# Patient Record
Sex: FEMALE | Race: WHITE | Employment: UNEMPLOYED | URBAN - METROPOLITAN AREA
[De-identification: names, ages, dates, MRNs, and addresses within clinical notes are randomized per-mention and may not be internally consistent; named-entity substitution may affect disease eponyms.]

---

## 2024-04-04 ENCOUNTER — OFFICE VISIT (OUTPATIENT)
Age: 39
End: 2024-04-04
Payer: COMMERCIAL

## 2024-04-04 VITALS
SYSTOLIC BLOOD PRESSURE: 130 MMHG | HEART RATE: 97 BPM | HEIGHT: 66 IN | BODY MASS INDEX: 26.92 KG/M2 | OXYGEN SATURATION: 99 % | DIASTOLIC BLOOD PRESSURE: 88 MMHG | TEMPERATURE: 97.8 F | WEIGHT: 167.5 LBS

## 2024-04-04 DIAGNOSIS — H93.A1 PULSATILE TINNITUS OF RIGHT EAR: Primary | ICD-10-CM

## 2024-04-04 DIAGNOSIS — H93.A1 PULSATILE TINNITUS OF RIGHT EAR: ICD-10-CM

## 2024-04-04 PROCEDURE — 99205 OFFICE O/P NEW HI 60 MIN: CPT | Performed by: RADIOLOGY

## 2024-04-04 RX ORDER — INDOMETHACIN 25 MG/1
25 CAPSULE ORAL 3 TIMES DAILY
COMMUNITY
Start: 2024-02-01

## 2024-04-04 NOTE — PROGRESS NOTES
New patient referred by Dr Castano @ [facility] presenting with Pulsatile tinnitus.  Spouse at visit.  Patient reports multiple symptoms.  Reports pulsing in right ear, headaches, ringing in ears and episodes of stabbing pain in head.

## 2024-04-04 NOTE — PATIENT INSTRUCTIONS
Diagnostic Angiogram at [facility]. Arrival time and date will be called to you.   Patient Registration. Main Hospital Entrance.  Blood work will be needed at least one week prior to procedure at a [facility].  -No eating or drinking after midnight the night prior to Angiogram.  -Take all morning medications with sips of water the morning of the angiogram.  -You must have a  to drive you home upon discharge.  -Recommend you have someone with you for at least 24-48 hours post procedure.  -No metal or glue in hair.    Referral to Neurology.

## 2024-04-05 DIAGNOSIS — H93.A1 PULSATILE TINNITUS OF RIGHT EAR: Primary | ICD-10-CM

## 2024-04-11 NOTE — PROGRESS NOTES
Clinic Note      Patient: Geri Wilcox MRN: [id number]  SSN: [id number]    YOB: 1985  Age: 38 y.o.  Sex: female      Subjective:      Geri Wilcox is a 38 y.o. female new patient with history of migraine headaches who presents on referral from Dr. Reyez for evaluation of right-sided pulsatile tinnitus.    Patient reports a whooshing sound that his pulse synchronous in her right ear.  This has been occurring for approximately 1 year.  She had a routine ENT surgery around that time and became more aware of the sound.  She does not believe that it is related to the surgery.  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  She also reports headaches, ringing in the ears, and episodes of stabbing pain in the head.  She denies any focal neurological symptoms.    Past Medical History:   Diagnosis Date    Migraines     Ringing in ears      History reviewed. No pertinent surgical history.   Family History   Problem Relation Age of Onset    Migraines Mother      Social History     Tobacco Use    Smoking status: Never    Smokeless tobacco: Never   Substance Use Topics    Alcohol use: Not Currently      Current Outpatient Medications   Medication Sig Dispense Refill    indomethacin (INDOCIN) 25 MG capsule Take 1 capsule by mouth in the morning, at noon, and at bedtime      MELATONIN PO Take 6 mg by mouth nightly       No current facility-administered medications for this visit.        No Known Allergies    Review of Systems:  Pertinent items are noted in the History of Present Illness.    Objective:     Vitals:    04/04/24 1131   BP: 130/88   Site: Left Upper Arm   Position: Sitting   Pulse: 97   Temp: 97.8 °F (36.6 °C)   TempSrc: Temporal   SpO2: 99%   Weight: 76 kg (167 lb 8 oz)   Height: 1.664 m (5' 5.5\")        Physical Exam:  GENERAL: alert, cooperative, no distress, appears stated age  EYE: negative  HEAD & NECK: no carotid bruit; bruit

## 2024-04-11 NOTE — H&P (VIEW-ONLY)
Clinic Note      Patient: Geri Wilcox MRN: [id number]  SSN: [id number]    YOB: 1985  Age: 38 y.o.  Sex: female      Subjective:      Geri Wilcox is a 38 y.o. female new patient with history of migraine headaches who presents on referral from Dr. Reyez for evaluation of right-sided pulsatile tinnitus.    Patient reports a whooshing sound that his pulse synchronous in her right ear.  This has been occurring for approximately 1 year.  She had a routine ENT surgery around that time and became more aware of the sound.  She does not believe that it is related to the surgery.  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  She also reports headaches, ringing in the ears, and episodes of stabbing pain in the head.  She denies any focal neurological symptoms.    Past Medical History:   Diagnosis Date    Migraines     Ringing in ears      History reviewed. No pertinent surgical history.   Family History   Problem Relation Age of Onset    Migraines Mother      Social History     Tobacco Use    Smoking status: Never    Smokeless tobacco: Never   Substance Use Topics    Alcohol use: Not Currently      Current Outpatient Medications   Medication Sig Dispense Refill    indomethacin (INDOCIN) 25 MG capsule Take 1 capsule by mouth in the morning, at noon, and at bedtime      MELATONIN PO Take 6 mg by mouth nightly       No current facility-administered medications for this visit.        No Known Allergies    Review of Systems:  Pertinent items are noted in the History of Present Illness.    Objective:     Vitals:    04/04/24 1131   BP: 130/88   Site: Left Upper Arm   Position: Sitting   Pulse: 97   Temp: 97.8 °F (36.6 °C)   TempSrc: Temporal   SpO2: 99%   Weight: 76 kg (167 lb 8 oz)   Height: 1.664 m (5' 5.5\")        Physical Exam:  GENERAL: alert, cooperative, no distress, appears stated age  EYE: negative  HEAD & NECK: no carotid bruit; bruit

## 2024-04-12 ENCOUNTER — CLINICAL DOCUMENTATION (OUTPATIENT)
Age: 39
End: 2024-04-12

## 2024-04-26 ENCOUNTER — TELEPHONE (OUTPATIENT)
Age: 39
End: 2024-04-26

## 2024-04-26 NOTE — TELEPHONE ENCOUNTER
Spoke to patient regarding taking Aspirin the night prior.  Informed patient to take 650 mg Aspirin the night prior to Diagnostic angiogram.  Patient has questions about the procedure. All questions were answered.  Patient stated understanding.

## 2024-04-29 ENCOUNTER — TELEPHONE (OUTPATIENT)
Age: 39
End: 2024-04-29

## 2024-04-29 NOTE — TELEPHONE ENCOUNTER
Spoke to patient to confirm procedure tomorrow.   Arrival time 9:00 am at Patient registration.    Reminder to patient:  -No eating or drinking after midnight the day prior to procedure.   -Take morning medications the morning of procedure with sips of water.   -Do not stop taking Blood Thinners if applicable unless notified by this office.  -You must have a  to drive you home upon discharge.  -Recommend you have someone with you for at least 24-48 hours post procedure.  -No metal of glue in hair.    Patient to take 650 mg Aspirin tonight.    Allergies reviewed.  Patient stated understanding.    Patient stated she hears something in her left ear now. Previously it was only her right ear.  Advised patient to mention this to provider tomorrow.

## 2024-04-30 ENCOUNTER — HOSPITAL ENCOUNTER (OUTPATIENT)
Facility: HOSPITAL | Age: 39
Discharge: HOME | End: 2024-04-30
Attending: RADIOLOGY | Admitting: RADIOLOGY
Payer: COMMERCIAL

## 2024-04-30 VITALS
HEART RATE: 73 BPM | BODY MASS INDEX: 27.82 KG/M2 | TEMPERATURE: 97.9 F | SYSTOLIC BLOOD PRESSURE: 127 MMHG | RESPIRATION RATE: 14 BRPM | DIASTOLIC BLOOD PRESSURE: 81 MMHG | OXYGEN SATURATION: 99 % | WEIGHT: 167 LBS | HEIGHT: 65 IN

## 2024-04-30 DIAGNOSIS — H93.A1 PULSATILE TINNITUS OF RIGHT EAR: ICD-10-CM

## 2024-04-30 LAB — HCG UR QL: NEGATIVE

## 2024-04-30 PROCEDURE — 81025 URINE PREGNANCY TEST: CPT

## 2024-04-30 PROCEDURE — 75710 ARTERY X-RAYS ARM/LEG: CPT

## 2024-04-30 PROCEDURE — 6370000000 HC RX 637 (ALT 250 FOR IP): Performed by: RADIOLOGY

## 2024-04-30 PROCEDURE — 2580000003 HC RX 258: Performed by: RADIOLOGY

## 2024-04-30 PROCEDURE — 6360000004 HC RX CONTRAST MEDICATION: Performed by: RADIOLOGY

## 2024-04-30 PROCEDURE — 2500000003 HC RX 250 WO HCPCS: Performed by: RADIOLOGY

## 2024-04-30 PROCEDURE — 6360000002 HC RX W HCPCS: Performed by: RADIOLOGY

## 2024-04-30 RX ORDER — VERAPAMIL HYDROCHLORIDE 2.5 MG/ML
INJECTION, SOLUTION INTRAVENOUS PRN
Status: COMPLETED | OUTPATIENT
Start: 2024-04-30 | End: 2024-04-30

## 2024-04-30 RX ORDER — 0.9 % SODIUM CHLORIDE 0.9 %
INTRAVENOUS SOLUTION INTRAVENOUS CONTINUOUS PRN
Status: COMPLETED | OUTPATIENT
Start: 2024-04-30 | End: 2024-04-30

## 2024-04-30 RX ORDER — LIDOCAINE 40 MG/G
CREAM TOPICAL PRN
Status: COMPLETED | OUTPATIENT
Start: 2024-04-30 | End: 2024-04-30

## 2024-04-30 RX ORDER — ACETAMINOPHEN AND CODEINE PHOSPHATE 300; 30 MG/1; MG/1
1 TABLET ORAL 2 TIMES DAILY PRN
COMMUNITY
Start: 2024-04-08

## 2024-04-30 RX ORDER — FENTANYL CITRATE 50 UG/ML
INJECTION, SOLUTION INTRAMUSCULAR; INTRAVENOUS PRN
Status: COMPLETED | OUTPATIENT
Start: 2024-04-30 | End: 2024-04-30

## 2024-04-30 RX ORDER — ASPIRIN 325 MG
650 TABLET ORAL ONCE
COMMUNITY
End: 2024-05-02 | Stop reason: DRUGHIGH

## 2024-04-30 RX ORDER — DIPHENHYDRAMINE HYDROCHLORIDE 50 MG/ML
INJECTION INTRAMUSCULAR; INTRAVENOUS PRN
Status: COMPLETED | OUTPATIENT
Start: 2024-04-30 | End: 2024-04-30

## 2024-04-30 RX ORDER — MIDAZOLAM HYDROCHLORIDE 2 MG/2ML
INJECTION, SOLUTION INTRAMUSCULAR; INTRAVENOUS PRN
Status: COMPLETED | OUTPATIENT
Start: 2024-04-30 | End: 2024-04-30

## 2024-04-30 RX ORDER — VALACYCLOVIR HYDROCHLORIDE 1 G/1
TABLET, FILM COATED ORAL
COMMUNITY
Start: 2024-03-27

## 2024-04-30 RX ORDER — HEPARIN SODIUM 1000 [USP'U]/ML
INJECTION, SOLUTION INTRAVENOUS; SUBCUTANEOUS PRN
Status: COMPLETED | OUTPATIENT
Start: 2024-04-30 | End: 2024-04-30

## 2024-04-30 RX ORDER — ACETAMINOPHEN 500 MG
1000 TABLET ORAL ONCE
Status: COMPLETED | OUTPATIENT
Start: 2024-04-30 | End: 2024-04-30

## 2024-04-30 RX ORDER — ONDANSETRON 2 MG/ML
INJECTION INTRAMUSCULAR; INTRAVENOUS PRN
Status: COMPLETED | OUTPATIENT
Start: 2024-04-30 | End: 2024-04-30

## 2024-04-30 RX ADMIN — FENTANYL CITRATE 25 MCG: 50 INJECTION, SOLUTION INTRAMUSCULAR; INTRAVENOUS at 12:13

## 2024-04-30 RX ADMIN — HEPARIN SODIUM 4000 ML: 1000 INJECTION INTRAVENOUS; SUBCUTANEOUS at 12:15

## 2024-04-30 RX ADMIN — IOPAMIDOL 59 ML: 612 INJECTION, SOLUTION INTRAVENOUS at 12:15

## 2024-04-30 RX ADMIN — LIDOCAINE 1 TUBE: 40 CREAM TOPICAL at 10:57

## 2024-04-30 RX ADMIN — Medication 200 MCG: at 11:22

## 2024-04-30 RX ADMIN — ONDANSETRON 4 MG: 2 INJECTION INTRAMUSCULAR; INTRAVENOUS at 10:56

## 2024-04-30 RX ADMIN — ACETAMINOPHEN 1000 MG: 500 TABLET ORAL at 12:29

## 2024-04-30 RX ADMIN — DIPHENHYDRAMINE HYDROCHLORIDE 25 MG: 50 INJECTION, SOLUTION INTRAMUSCULAR; INTRAVENOUS at 10:57

## 2024-04-30 RX ADMIN — HEPARIN SODIUM 2000 UNITS: 1000 INJECTION INTRAVENOUS; SUBCUTANEOUS at 11:21

## 2024-04-30 RX ADMIN — MIDAZOLAM HYDROCHLORIDE 0.5 MG: 1 INJECTION, SOLUTION INTRAMUSCULAR; INTRAVENOUS at 11:12

## 2024-04-30 RX ADMIN — SODIUM CHLORIDE 500 ML: 9 INJECTION, SOLUTION INTRAVENOUS at 11:02

## 2024-04-30 RX ADMIN — VERAPAMIL HYDROCHLORIDE 2.5 MG: 2.5 INJECTION, SOLUTION INTRAVENOUS at 11:21

## 2024-04-30 RX ADMIN — FENTANYL CITRATE 25 MCG: 50 INJECTION, SOLUTION INTRAMUSCULAR; INTRAVENOUS at 11:12

## 2024-04-30 RX ADMIN — NITROGLYCERIN 1 INCH: 20 OINTMENT TOPICAL at 10:57

## 2024-04-30 ASSESSMENT — PULMONARY FUNCTION TESTS
PIF_VALUE: 0

## 2024-04-30 ASSESSMENT — PAIN SCALES - GENERAL: PAINLEVEL_OUTOF10: 2

## 2024-04-30 ASSESSMENT — PAIN DESCRIPTION - FREQUENCY: FREQUENCY: INTERMITTENT

## 2024-04-30 ASSESSMENT — PAIN DESCRIPTION - ORIENTATION: ORIENTATION: RIGHT

## 2024-04-30 ASSESSMENT — PAIN DESCRIPTION - PAIN TYPE: TYPE: ACUTE PAIN

## 2024-04-30 ASSESSMENT — PAIN DESCRIPTION - LOCATION: LOCATION: HEAD

## 2024-04-30 ASSESSMENT — PAIN DESCRIPTION - DESCRIPTORS: DESCRIPTORS: SHARP;SHOOTING

## 2024-04-30 NOTE — DISCHARGE INSTRUCTIONS
Brain Angiogram: What to Expect at Home  Your Recovery  A brain angiogram (cerebral angiogram) is a test (also called a procedure) that looks for problems with blood vessels and blood flow in the brain. The doctor inserted a thin, flexible tube (catheter) into a blood vessel in your groin. Or the doctor may have put the catheter in a blood vessel in your arm. Then a dye was inserted into the catheter. The dye flowed into the blood vessel. The dye made the blood vessels show up on a video screen.  You may have had this test to see if a blood vessel in the brain is bulging, narrowed, or blocked. The test may also be used to check other symptoms, such as unusual headaches, or to check problems found during a different test.  You may have a bruise where the catheter was put in, and you may feel sore for a few days. You can do light activities around the house. But don't do anything strenuous for several days.  This care sheet gives you a general idea about how long it will take for you to recover. But each person recovers at a different pace. Follow the steps below to feel better as quickly as possible.  How can you care for yourself at home?  Activity    Do not do strenuous exercise and do not lift, pull, or push anything heavy until your doctor says it is okay. This may be for several days. You can walk around the house and do light activity, such as cooking.     If the catheter was placed in your groin, try not to walk up stairs for the first couple of days.     If the catheter was placed in your arm near your wrist, do not bend your wrist deeply for the first couple of days. Be careful using your hand to get into and out of a chair or bed.     If your doctor recommends it, get more exercise. Walking is a good choice. Bit by bit, increase the amount you walk every day. Try for at least 30 minutes on most days of the week.   Diet    Drink plenty of fluids to help your body flush out the dye. If you have kidney,

## 2024-04-30 NOTE — BRIEF OP NOTE
Brief Procedure Note      Patient: Geri Wilcox MRN: [id number]     YOB: 1985  Age: 38 y.o.  Sex: female      Service: Neurointerventional Surgery    Date of Procedure: 4/30/2024    Pre-Procedure Diagnosis: Pulsatile tinnitus    Post-Procedure Diagnosis: SAME    : Vincent Ramesh MD    Assistant(s): N/A    Procedure(s):   Diagnostic cerebral angiogram  Ultrasound guided vascular access  Placement of arteriotomy closure device    Vessels Studied:   Right CCA  Right ICA  Right ECA  Left CCA  Left subclavian artery  Right vertebral artery    Puncture Site: Right radial artery    Preliminary Findings:   No dAVF.    Plan:   RTC to review findings      Specimens: None    Implants: None    Drains: None    Anesthesia: Moderate Sedation    Estimated Blood Loss: 5 cc      Apparent Intraoperative Complications: None immediate    Patient Condition: Stable    Disposition: Same day recovery unit      Signed:   Vincent Ramesh MD    [facility] Neurointerventional Surgery  [facility]

## 2024-04-30 NOTE — PRE SEDATION
Sedation Pre-Procedure Note    Patient Name: Geri Wilcox   YOB: 1985  Room/Bed: AIR/PL  Medical Record Number: [id number]  Date: 4/30/2024   Time: 10:27 AM       Indication:  Pulsatile tinnitus    Consent: I have discussed with the patient and/or the patient representative the indication, alternatives, and the possible risks and/or complications of the planned procedure and the anesthesia methods. The patient and/or patient representative appear to understand and agree to proceed.    Vital Signs:   Vitals:    04/30/24 0930   BP: (!) 133/93   Pulse: 76   Resp: 11   Temp: 98.3 °F (36.8 °C)   SpO2: 99%       Past Medical History:   has a past medical history of Migraines and Ringing in ears.    Past Surgical History:   has a past surgical history that includes Salivary gland surgery.    Medications:   Scheduled Meds:    heparin 4000 units/1000 mL (4 units/mL) in NS   IntraVENous Once     Continuous Infusions:   PRN Meds:   Home Meds:   Prior to Admission medications    Medication Sig Start Date End Date Taking? Authorizing Provider   acetaminophen-codeine (TYLENOL #3) 300-30 MG per tablet Take 1 tablet by mouth 2 times daily as needed. Max Daily Amount: 2 tablets 4/8/24  Yes Teresa Schreiber MD   valACYclovir (VALTREX) 1 g tablet  3/27/24  Yes Teresa Schreiber MD   aspirin 325 MG tablet Take 2 tablets by mouth once   Yes Teresa Schreiber MD   indomethacin (INDOCIN) 25 MG capsule Take 1 capsule by mouth in the morning, at noon, and at bedtime 2/1/24   Teresa Schreiber MD   MELATONIN PO Take 6 mg by mouth nightly    Teresa Schreiber MD     Coumadin Use Last 7 Days:  no  Antiplatelet drug therapy use last 7 days: yes - aspirin 650 mg last night (as instructed)  Other anticoagulant use last 7 days: no  Additional Medication Information:  N/A      Pre-Sedation Documentation and Exam:   I have personally completed a history, physical exam & review of systems for this patient (see

## 2024-04-30 NOTE — INTERVAL H&P NOTE
Update History & Physical    The patient's History and Physical of April 11, 2024 was reviewed with the patient and I examined the patient. There was no change. She reports a new sound in her ears that sounds like volume changing in speakers, not pulsatile, not constant. Other symptoms stable. The surgical site was confirmed by the patient and me.     Plan: The risks, benefits, expected outcome, and alternative to the recommended procedure have been discussed with the patient. Patient understands and wants to proceed with the procedure.     Electronically signed by Vincent Ramesh MD on 4/30/2024 at 10:24 AM

## 2024-04-30 NOTE — PROGRESS NOTES
Patient presents ambulatory for diagnostic cerebral angiogram accompanied by     1030  Dr. Ramesh at bedside obtaining consent and received order to administer 500 milliliter bolus of normal saline and 4 milligrams zofran, 25 milligrams benadryl IV prior to procedure start, and 1 gram of tylenol oral post procedure    1230  Updated patient's  on patient status and completion of procedure    1420  Procedure: diagnostic cerebral angiogram    Sedation:0.5 milligrams versed and 50 micrograms fentanyl    Site: right radial    Discharge Details:discharge teaching completed and understanding verbalized, vitals stable, wrist clean, dry, and intact, patient denies pain, neuro assessment within normal limits and unchanged from admission, patient discharged home with  via wheelchair

## 2024-05-02 ENCOUNTER — TELEMEDICINE (OUTPATIENT)
Age: 39
End: 2024-05-02
Payer: COMMERCIAL

## 2024-05-02 DIAGNOSIS — H93.A1 PULSATILE TINNITUS OF RIGHT EAR: Primary | ICD-10-CM

## 2024-05-02 PROCEDURE — 99214 OFFICE O/P EST MOD 30 MIN: CPT | Performed by: RADIOLOGY

## 2024-05-02 RX ORDER — ASPIRIN 81 MG/1
TABLET ORAL
Qty: 30 TABLET | Refills: 5 | Status: SHIPPED | OUTPATIENT
Start: 2024-05-02

## 2024-05-02 NOTE — PROGRESS NOTES
Virtual Clinic Note    Geri Wilcox is a 38 y.o. female established patient who was seen by synchronous (real-time) audio-video technology on 5/2/2024.      Consent: Geri Wilcox, who was seen by synchronous (real-time) audio-video technology, and/or her healthcare decision maker, is aware that this patient-initiated, Telehealth encounter on 5/2/2024 is a billable service, with coverage as determined by her insurance carrier. She is aware that she may receive a bill and has provided verbal consent to proceed: Yes    Assessment & Plan:   38 y.o. female with history of migraine headaches and pulsatile tinnitus who presents on follow-up after diagnostic cerebral angiogram.    Patient has been doing well since her angiogram procedure a couple days ago.  She reports mild, dull headaches but otherwise no focal neurological symptoms.  She has not had any issues with the right radial artery puncture site.     We reviewed the results of her diagnostic cerebral angiogram.  There is no evidence of a dural arteriovenous fistula.  There is tortuosity of the distal cervical internal carotid arteries bilaterally.  The distal cervical internal carotid arteries demonstrate pulsatile mobility relative to the petrous segments.  There is very minimal intimal irregularity involving the intracranial segments of both internal carotid arteries.  Otherwise, the intracranial arteries are normal.  There are bilateral high riding jugular bulbs.  There is relative narrowing of the distal right sigmoid sinus.  Mastoid emissary veins are seen bilaterally.    We discussed that the carotid artery findings as well as the venous findings described above could all potentially be sources for pulsatile tinnitus, but that they do not pose any other pathologic threat.  There is no target for endovascular treatment.  That being said, the tortuous distal cervical internal carotid arteries demonstrate pulsatile mobility relative to the petrous segments.

## 2024-05-03 NOTE — PROGRESS NOTES
Phone call to patient in preparation for Virtual/phone visit with provider.  Name and  verified.  Patient unable to obtain vital signs at home.  Patient reports minor swelling right wrist.  Reports head pain when she leans forward, sneezes or cough.  Denies dizziness, n/v, headache, neck pain or stiffness.

## 2024-10-01 ENCOUNTER — OFFICE VISIT (OUTPATIENT)
Age: 39
End: 2024-10-01
Payer: COMMERCIAL

## 2024-10-01 VITALS
BODY MASS INDEX: 27.49 KG/M2 | SYSTOLIC BLOOD PRESSURE: 128 MMHG | HEIGHT: 65 IN | HEART RATE: 102 BPM | WEIGHT: 165 LBS | OXYGEN SATURATION: 99 % | DIASTOLIC BLOOD PRESSURE: 84 MMHG

## 2024-10-01 DIAGNOSIS — G44.89 CHRONIC MIXED HEADACHE SYNDROME: Primary | ICD-10-CM

## 2024-10-01 PROCEDURE — 99204 OFFICE O/P NEW MOD 45 MIN: CPT | Performed by: PSYCHIATRY & NEUROLOGY

## 2024-10-01 RX ORDER — MECLIZINE HYDROCHLORIDE 25 MG/1
TABLET ORAL
COMMUNITY
End: 2024-10-01

## 2024-10-01 RX ORDER — CYCLOBENZAPRINE HCL 5 MG
TABLET ORAL
COMMUNITY

## 2024-10-01 RX ORDER — SUMATRIPTAN 50 MG/1
50 TABLET, FILM COATED ORAL AS NEEDED
Qty: 9 TABLET | Refills: 0 | Status: SHIPPED | OUTPATIENT
Start: 2024-10-01

## 2024-10-01 ASSESSMENT — PATIENT HEALTH QUESTIONNAIRE - PHQ9
SUM OF ALL RESPONSES TO PHQ9 QUESTIONS 1 & 2: 0
1. LITTLE INTEREST OR PLEASURE IN DOING THINGS: NOT AT ALL
SUM OF ALL RESPONSES TO PHQ QUESTIONS 1-9: 0
2. FEELING DOWN, DEPRESSED OR HOPELESS: NOT AT ALL

## 2024-10-01 NOTE — PROGRESS NOTES
Chief Complaint   Patient presents with    Neurologic Problem     Ringing in the right ear, \" I also have migraine and ice pick headaches, the migraines are more frequent, sometimes one causes the other.\"     HISTORY OF PRESENT ILLNESS  Geri Wilcox is a 39 y.o. female who came in for evaluation of headaches that she has been experiencing over the past year or so.  She has self seen neurology at [facility] and recently saw neurointerventional surgery Dr. Ramesh.   She started experiencing headaches described as intermittent stabbing pains mainly in the occipital region and then they would settle down to the right side of her head.  These were suspected to be idiopathic stabbing headaches and these responded quite well to indomethacin initially.  However, it caused some complications including bruising inside her ovaries and had to be weaned down.  Then she was put on melatonin which has also been helping.  Back in August that she had an episode where she had a C shaped scotoma in the peripheral vision of her right eye that lasted 40 to 45 minutes.  Was not associated with a headache initially but eventually she did get some.  She has had 3 such episodes.  She does not report any photophobia, lacrimation, congestion in her nose, diplopia or other focal motor or sensory deficits with her headaches.  Her headaches do tend to be clustered together for several days in a row.  Cannot really think of any triggers. Uses ibuprofen which may help but not consistently.  She has also experienced ringing sounds/pulsatile tinnitus in her right ear for which she had an angiogram done which was unremarkable.  MRI of the brain was unremarkable.  Overall she has been doing better and in gets these different types of headaches intermittently.  Mother has chronic migraines.   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 
Chief Complaint   Patient presents with    Neurologic Problem     Ringing in the right ear, \" I also have migraine and ice pick headaches, the migraines are more frequent, sometimes one causes the other.\"     Vitals:    10/01/24 0903   BP: 128/84   Pulse: (!) 102   SpO2: 99%       
resilient/elastic

## 2025-01-20 DIAGNOSIS — H93.A1 PULSATILE TINNITUS OF RIGHT EAR: Primary | ICD-10-CM
